# Patient Record
Sex: MALE
[De-identification: names, ages, dates, MRNs, and addresses within clinical notes are randomized per-mention and may not be internally consistent; named-entity substitution may affect disease eponyms.]

---

## 2017-05-31 NOTE — RAD
HISTORY:

COUGH  



COMPARISON:

None available. 



TECHNIQUE:

Chest PA and lateral



FINDINGS:

Examination limited by habitus.



LUNGS:

No focal consolidation.



Please note that chest x-ray has limited sensitivity for the 

detection of pulmonary masses.



PLEURA:

No significant pleural effusion identified. No definite pneumothorax .



CARDIOVASCULAR:

The cardiomediastinal silhouette appears within normal limits of 

size. 



OSSEOUS STRUCTURES:

 No acute osseous abnormality identified.



VISUALIZED UPPER ABDOMEN:

Unremarkable.



OTHER FINDINGS:

None.



IMPRESSION:

No focal consolidation, significant pleural effusion, or definite 

pneumothorax identified.

## 2017-05-31 NOTE — C.PDOC
History Of Present Illness





40 y/o male presents to emergency department with complaints of ear pain, sore 

throat, generalized weakness, polyuria, and polydipsia, and tingling to the 

hands and feet for several days. Denies known history of diabetes but he notes 

that his sister is diabetic. Patient denies chest pain, SOB, palpitations, 

nausea, vomiting, diarrhea, or other associated symptoms. 





Time Seen by Provider: 05/31/17 16:44


Chief Complaint (Nursing): Weakness/Neurological Deficit


History Per: Patient


History/Exam Limitations: no limitations


Onset/Duration Of Symptoms: Days


Current Symptoms Are (Timing): Still Present


Severity: Mild


Recent travel outside of the [place]: No





Past Medical History


Reviewed: Historical Data, Nursing Documentation, Vital Signs


Vital Signs: 


 Last Vital Signs











Temp  98.2 F   05/31/17 19:16


 


Pulse  58 L  05/31/17 19:16


 


Resp  18   05/31/17 19:16


 


BP  105/71   05/31/17 19:16


 


Pulse Ox  98   05/31/17 19:16














- Medical History


PMH: Asthma (childhood)


Family History: States: No Known Family Hx





- Social History


Hx Tobacco Use: No


Hx Alcohol Use: No


Hx Substance Use: No





- Immunization History


Hx Influenza Vaccination: No





Review Of Systems


Except As Marked, All Systems Reviewed And Found Negative.


Constitutional: Positive for: Weakness, Other (polydispsia).  Negative for: 

Fever, Chills


ENT: Positive for: Ear Pain, Throat Pain


Cardiovascular: Negative for: Chest Pain


Respiratory: Negative for: Cough, Shortness of Breath


Gastrointestinal: Negative for: Nausea, Vomiting, Abdominal Pain


Genitourinary: Positive for: Other (polyuria).  Negative for: Dysuria, Hematuria


Skin: Negative for: Rash


Neurological: Positive for: Other (tingling in hands and feet)





Physical Exam





- Physical Exam


Appears: Well, Non-toxic, No Acute Distress


Skin: Warm, Dry


Head: Normacephalic


Oral Mucosa: Moist


Cardiovascular: Rhythm Regular


Respiratory: Normal Breath Sounds, No Rales, No Rhonchi, No Wheezing


Gastrointestinal/Abdominal: Normal Exam, Bowel Sounds, Soft, No Tenderness


Back: Normal Inspection


Male Genital: Other (erythema and white yeast like discharge on glans , no 

vesicular lesions)


Extremity: Normal ROM


Neurological/Psych: Oriented x3, Normal Speech, Normal Cognition





ED Course And Treatment





- Laboratory Results


Result Diagrams: 


 05/31/17 17:25





 05/31/17 17:25


O2 Sat by Pulse Oximetry: 97 (RA)


Pulse Ox Interpretation: Normal





- Other Rad


  ** CXR 


X-Ray: Viewed By Me, Read By Radiologist


Interpretation: Accession No. : [id number].  Patient Name / ID : RAYMOND MARQUEZ  / [id number].  Exam Date : 05/31/2017 17:48:29 ( Approved ).  Study 

Comment :  Sex / Age : M  / 41Y.  Creator : Maryse Bynum MD.  

Dictator : Maryse Bynum MD.   :  Approver : Maryse Bynum MD.  Approver2 :  Report Date : 05/31/2017 17:56:55.  My Comment :  

********************************************************************************

***.  HISTORY:  COUGH.  COMPARISON:  None available.  TECHNIQUE:  Chest PA and 

lateral.  FINDINGS:  Examination limited by habitus.  LUNGS:  No focal 

consolidation.  Please note that chest x-ray has limited sensitivity for the 

detection of pulmonary masses.  PLEURA:  No significant pleural effusion 

identified. No definite pneumothorax .  CARDIOVASCULAR:  The cardiomediastinal 

silhouette appears within normal limits of size.  OSSEOUS STRUCTURES:  No acute 

osseous abnormality identified.  VISUALIZED UPPER ABDOMEN:  Unremarkable.  

OTHER FINDINGS:  None.  IMPRESSION:  No focal consolidation, significant 

pleural effusion, or definite pneumothorax identified.


Progress Note: Pt found to be hyperglycemic, glucose in 300s. Bloodwork, 

urinalysis, CxR, EKG ordered and reviewed.  Patient given IV NS bolus, IV 

toradol.  IV insulin also given.


Reevaluation Time: 19:05


Reassessment Condition: Improved (On reassessment, patient is resting 

comfortably, in no distress.  Accucheck is 295.  Blood work without evidence of 

ketones/acidosis.  Patient given Rxs for Lotrimin cream and Metformin.  He was 

instructed to follow up with medical clinic in 1-2 days, and understands he 

should return to ED if symptoms worsen.)





Disposition


Counseled Patient/Family Regarding: Studies Performed, Diagnosis, Need For 

Followup, Rx Given





- Disposition


Referrals: 


Ele Beckford MD [Staff Provider] - 


Disposition: HOME/ ROUTINE


Disposition Time: 19:05


Condition: STABLE


Additional Instructions: 


SEGUIMIENTO CON DE MDICO EN 1 -2 DAWSON





BEBER MUCHO LQUIDO





USE MEDICAMENTOS SEGN LO DIRIGIDO





DEVUELVA A LA NORBERT DE EMERGENCIA SI LOS SNTOMAS EMPEORARAN


Prescriptions: 


Clotrimazole 1% Cream [Lotrimin 1%] 30 gm EXT BID #1 tube


Instructions:  Upper Respiratory Infection (ED), Viral Syndrome (ED), Diabetic 

Hyperglycemia (ED), Skin Yeast Infection (ED)


Print Language: Armenian





- POA


Present On Arrival: None





- Clinical Impression


Clinical Impression: 


 Hyperglycemia, Diabetes mellitus, new onset, Tinea cruris








- Scribe Statement


The provider has reviewed the documentation as recorded by the Scribjoanie Roland





All medical record entries made by the Scribe were at my direction and 

personally dictated by me. I have reviewed the chart and agree that the record 

accurately reflects my personal performance of the history, physical exam, 

medical decision making, and the department course for this patient. I have 

also personally directed, reviewed, and agree with the discharge instructions 

and disposition.

## 2017-12-01 NOTE — C.PDOC
History Of Present Illness





<Seferino Caro - Last Filed: 12/01/17 14:09>





<EzraSydney - Last Filed: 12/01/17 14:29>





This is a 41 y/o gentleman with a hx of diabetes being treated with metformin 

who is presenting with 1 day hx of LBP. He has had relapsing-remitting LBP in 

the past that has resolved on its own. This has been happening for many years, 

he is unable to deny any specific exacerbation factor, or trauma at onset of 

pain. He states that this morning when he went to rise out of his seat he 

experienced a sharp pain radiating from the left lower back area into the back 

of the knee on the left. He finds it difficult to sit due to pressure on his 

left buttocks and thigh. He has not taken any analgesics to alleviate the pain. 

He denies fever, chills, nausea, vomiting, CP/SOB, numbness, tingling, loss of 

bowel/bladder function. (Seferino Caro)


History Per: Patient


Onset/Duration Of Symptoms: Hrs


Quality Of Discomfort: Sharp


Severity: Severe


Pain Scale Rating Of: 10


Associated Symptoms: None.  denies: Incontinence, New Weakness, New Numbness


Exacerbating Factor(s): Sitting





<Seferino Caro - Last Filed: 12/01/17 14:09>





<Sydney Munguia - Last Filed: 12/01/17 14:29>


Time Seen by Provider: 12/01/17 14:02


Chief Complaint (Nursing): Back Pain





Past Medical History





- Medical History


PMH: Asthma (childhood), Diabetes


Surgical History: No Surg Hx


Family History: States: Unknown Family Hx





- Social History


Hx Tobacco Use: No


Hx Alcohol Use: No


Hx Substance Use: No





- Immunization History


Hx Influenza Vaccination: No





<Seferino Caro - Last Filed: 12/01/17 14:09>


Vital Signs: 


 Last Vital Signs











Temp  98 F   12/01/17 13:57


 


Pulse  68   12/01/17 13:57


 


Resp  20   12/01/17 13:57


 


BP  109/72   12/01/17 13:57


 


Pulse Ox  99   12/01/17 14:19














Review Of Systems


Constitutional: Negative for: Fever, Chills


Cardiovascular: Negative for: Chest Pain, Palpitations


Respiratory: Negative for: Cough, Shortness of Breath


Gastrointestinal: Negative for: Nausea, Vomiting, Abdominal Pain, Diarrhea, 

Constipation


Musculoskeletal: Positive for: Back Pain (lower back pain on the left radiating 

into the left leg)


Neurological: Negative for: Weakness, Numbness





<Seferino Caro - Last Filed: 12/01/17 14:09>





Physical Exam





- Physical Exam


Appears: Other (mild distress, uncomfortable secondary to pain in his lower back

)


Skin: Warm, Dry, No Rash


Head: Atraumatic, Normacephalic


Eye(s): bilateral: Normal Inspection


Throat: Normal


Cardiovascular: Rhythm Regular


Respiratory: Normal Breath Sounds, No Rales, No Rhonchi


Gastrointestinal/Abdominal: Soft, No Tenderness


Back: No CVA Tenderness, Muscle Spasm (left L-spine), Paraspinal Tenderness (

left sided L-spine area), Straight Leg Raising (positive on left)


DTR: Knee (R): 2+, Knee (L): 2+, Ankle (R): 2+, Ankle (L): 2+


Neurological/Psych: Oriented x3, Normal Speech, Normal Cognition, Normal Motor, 

Normal Sensation


Gait: Other (compromised by pain)





  __________________________














  __________________________





 1 - left side of lower back is tender to palpation, no gross deformity on exam

, no rashes noted, straight leg raise positive on the left.








<Seferino Caro - Last Filed: 12/01/17 14:09>





ED Course And Treatment


O2 Sat by Pulse Oximetry: 99





<Seferino Caro - Last Filed: 12/01/17 14:09>





Supervising Attending Note





<Seferino Caro - Last Filed: 12/01/17 14:09>





<Sydney Munguia - Last Filed: 12/01/17 14:29>





- Notes:


Notes:: 





NEW ONSET L SIDED LBP RADIATION L LEG SINCE THIS MORNING. ONSET WHILE GETTING 

OFF COUCH. HO CHRONIC BACK NORMALLY MIDLINE. NO TRAUMA, OTHER ASSOC SX. EXAM AS 

ABOVE. NO PAIN MEDS TRIED. (Sydney Munguia)





Disposition





<Seferino Caro - Last Filed: 12/01/17 14:09>


Counseled Patient/Family Regarding: Diagnosis, Need For Followup, Rx Given





- Disposition


Disposition Time: 14:29





<Sydney Munguia - Last Filed: 12/01/17 14:29>





- Disposition


Referrals: 


[facility] [Outside]


[facility] [Outside]


Disposition: HOME/ ROUTINE


Condition: IMPROVED


Prescriptions: 


Acetaminophen [Tylenol Extra Strength] 2 tab PO Q6 #30 tablet


Cyclobenzaprine [Flexeril] 10 mg PO TID #15 tab


Ibuprofen [Motrin] 600 mg PO Q6 #30 tab


Lidocaine 5% [Lidoderm] 1 ea TD PRN PRN #10 patch


 PRN Reason: Pain, Moderate (4-7)


Instructions:  Acute Low Back Pain (ED)


Forms:  [facility] (English)





- Clinical Impression


Clinical Impression: 


 Low back pain

## 2019-01-16 ENCOUNTER — HOSPITAL ENCOUNTER (EMERGENCY)
Dept: HOSPITAL 31 - C.ER | Age: 44
End: 2019-01-16
Payer: SELF-PAY

## 2023-01-03 ENCOUNTER — HOSPITAL ENCOUNTER (EMERGENCY)
Dept: HOSPITAL 93 - ER | Age: 48
Discharge: HOME | End: 2023-01-03
Payer: COMMERCIAL

## 2023-01-03 VITALS — BODY MASS INDEX: 31.1 KG/M2 | HEIGHT: 69 IN | WEIGHT: 210 LBS
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
## 2023-01-03 DIAGNOSIS — S40.021A: Primary | ICD-10-CM
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
## 2023-01-03 DIAGNOSIS — S40.011A: ICD-10-CM
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
